# Patient Record
Sex: MALE | Race: ASIAN | NOT HISPANIC OR LATINO | Employment: UNEMPLOYED | URBAN - METROPOLITAN AREA
[De-identification: names, ages, dates, MRNs, and addresses within clinical notes are randomized per-mention and may not be internally consistent; named-entity substitution may affect disease eponyms.]

---

## 2017-03-21 ENCOUNTER — HOSPITAL ENCOUNTER (EMERGENCY)
Facility: HOSPITAL | Age: 47
Discharge: HOME | End: 2017-03-22
Attending: FAMILY MEDICINE

## 2017-03-21 DIAGNOSIS — S99.912A LEFT ANKLE INJURY, INITIAL ENCOUNTER: ICD-10-CM

## 2017-03-21 PROCEDURE — 99283 EMERGENCY DEPT VISIT LOW MDM: CPT | Mod: ,,, | Performed by: EMERGENCY MEDICINE

## 2017-03-21 PROCEDURE — 99283 EMERGENCY DEPT VISIT LOW MDM: CPT

## 2017-03-21 NOTE — ED AVS SNAPSHOT
[facility]  [address]  [facility] [address]               Mic Hunter   3/21/2017 11:26 PM   ED    Description:  Male : 1970   Department:  [facility]           Your Care was Coordinated By:     Provider Role From To    Jayce Smith MD Attending Provider [phone]    Duarte Ogden MD Attending Provider 17 013 --      Reason for Visit     Ankle Pain           Diagnoses this Visit        Comments    Left ankle injury, initial encounter           ED Disposition     ED Disposition Condition Comment    Discharge             To Do List           Follow-up Information     Follow up with [facility] - Orthopedics.    Specialty:  Orthopedics    Contact information:    [address] [address]  [facility] [address] [phone]    Additional information:    [address] - [address]        [facility]    Specialty:  Emergency Medicine    Why:  ASAP for new or worsening symptoms    Contact information:    [address]  [facility] [address] [phone]       These Medications        Disp Refills Start End    ibuprofen (ADVIL,MOTRIN) 600 MG tablet 20 tablet 0 3/22/2017     Take 1 tablet (600 mg total) by mouth every 6 (six) hours as needed for Pain. - Oral    hydrocodone-acetaminophen 5-325mg (NORCO) 5-325 mg per tablet 5 tablet 0 3/22/2017     Take 1 tablet by mouth every 6 (six) hours as needed. - Oral      [facility]     [facility] -  Assistance  Registered nurses in the [facility] provide clinical advisement, health education, appointment booking, and other advisory services.  Call for this free service at [phone].             Medications           Message regarding Medications     Verify the changes and/or additions to your medication regime listed below are the same as discussed with your clinician today.  If any  "of these changes or additions are incorrect, please notify your healthcare provider.        START taking these NEW medications        Refills    ibuprofen (ADVIL,MOTRIN) 600 MG tablet 0    Sig: Take 1 tablet (600 mg total) by mouth every 6 (six) hours as needed for Pain.    Class: Print    Route: Oral    hydrocodone-acetaminophen 5-325mg (NORCO) 5-325 mg per tablet 0    Sig: Take 1 tablet by mouth every 6 (six) hours as needed.    Class: Print    Route: Oral           Verify that the below list of medications is an accurate representation of the medications you are currently taking.  If none reported, the list may be blank. If incorrect, please contact your healthcare provider. Carry this list with you in case of emergency.           Current Medications     hydrocodone-acetaminophen 5-325mg (NORCO) 5-325 mg per tablet Take 1 tablet by mouth every 6 (six) hours as needed.    ibuprofen (ADVIL,MOTRIN) 600 MG tablet Take 1 tablet (600 mg total) by mouth every 6 (six) hours as needed for Pain.           Clinical Reference Information           Your Vitals Were     BP Pulse Temp Resp Height Weight    134/84 64 97.8 °F (36.6 °C) (Oral) 18 5' 2" (1.575 m) 59 kg (130 lb)    SpO2 BMI             94% 23.78 kg/m2         Allergies as of 3/22/2017     No Known Allergies      Immunizations Administered on Date of Encounter - 3/22/2017     None      ED Micro, Lab, POCT     None      ED Imaging Orders     Start Ordered       Status Ordering Provider    03/22/17 0016 03/22/17 0015  X-Ray Ankle Complete Left  1 time imaging      Final result       Discharge References/Attachments     SPRAIN, ANKLE (ADULT) (ENGLISH)      [facility] Sign-Up     Activating your [facility] account is as easy as 1-2-3!     1) Visit [email or web address], select Sign Up Now, enter this activation code and your date of birth, then select Next.  [email or web address]  Expires: 5/6/2017  1:54 AM      2) Create a username and password to use when you visit [facility] in the " future and select a security question in case you lose your password and select Next.    3) Enter your e-mail address and click Sign Up!    Additional Information  If you have questions, please e-mail [email or web address] or call [phone] to talk to our [facility] staff. Remember, [facility] is NOT to be used for urgent needs. For medical emergencies, dial 911.         Smoking Cessation     If you would like to quit smoking:   You may be eligible for free services if you are a [place] resident and started smoking cigarettes before September 1, 1988.  Call the Smoking Cessation [employer] (S[employer]) toll free at (164) 187-9996 or (289) 427-7606.   Call 4-485-QUIT-NOW if you do not meet the above criteria.             [facility] complies with applicable Federal civil rights laws and does not discriminate on the basis of race, color, national origin, age, disability, or sex.        Language Assistance Services     ATTENTION: Language assistance services are available, free of charge. Please call [phone].      ATENCIÓN: Si habla español, tiene a blas disposición servicios gratuitos de asistencia lingüística. Llame al [phone].     CHÚ Ý: N?u b?n nói Ti?ng Vi?t, có các d?ch v? h? tr? ngôn ng? mi?n phí dành cho b?n. G?i s? [phone].

## 2017-03-22 VITALS
DIASTOLIC BLOOD PRESSURE: 84 MMHG | SYSTOLIC BLOOD PRESSURE: 134 MMHG | TEMPERATURE: 98 F | OXYGEN SATURATION: 94 % | RESPIRATION RATE: 18 BRPM | WEIGHT: 130 LBS | HEART RATE: 64 BPM | HEIGHT: 62 IN | BODY MASS INDEX: 23.92 KG/M2

## 2017-03-22 RX ORDER — HYDROCODONE BITARTRATE AND ACETAMINOPHEN 5; 325 MG/1; MG/1
1 TABLET ORAL EVERY 6 HOURS PRN
Qty: 5 TABLET | Refills: 0 | Status: SHIPPED | OUTPATIENT
Start: 2017-03-22

## 2017-03-22 RX ORDER — IBUPROFEN 600 MG/1
600 TABLET ORAL EVERY 6 HOURS PRN
Qty: 20 TABLET | Refills: 0 | Status: SHIPPED | OUTPATIENT
Start: 2017-03-22

## 2017-03-22 NOTE — ED PROVIDER NOTES
Encounter Date: 3/21/2017    SCRIBE #1 NOTE: I, Faizan Bryan, am scribing for, and in the presence of,  Dr. Ogden . I have scribed the entire note.       History     Chief Complaint   Patient presents with    Ankle Pain     Patient reports that he injured left ankle, unknown how injury occured     Review of patient's allergies indicates:  No Known Allergies  HPI Comments: Time seen by provider: 1:50 AM    This is a 47 y.o. male who presents to the ED for evaluation of left ankle pain and swelling secondary to an injury earlier today. The patient denies any numbness or weakness of the extremity or any other sustained injuries.        The history is provided by the patient.     History reviewed. No pertinent past medical history.  No past surgical history on file.  No family history on file.  Social History   Substance Use Topics    Smoking status: None    Smokeless tobacco: None    Alcohol use None     Review of Systems   Constitutional: Negative for fever.   HENT: Negative for sore throat.    Respiratory: Negative for shortness of breath.    Cardiovascular: Negative for chest pain.   Gastrointestinal: Negative for nausea.   Genitourinary: Negative for dysuria.   Musculoskeletal: Negative for back pain.        Left ankle swelling and pain.   Skin: Negative for rash.   Neurological: Negative for weakness.   Hematological: Does not bruise/bleed easily.       Physical Exam   Initial Vitals   BP Pulse Resp Temp SpO2   03/21/17 1910 03/21/17 1910 03/21/17 1910 03/21/17 1910 03/21/17 1910   138/93 77 18 98.2 °F (36.8 °C) 96 %     Physical Exam    Nursing note and vitals reviewed.  Constitutional: He appears well-developed and well-nourished. He is not diaphoretic. No distress.   HENT:   Head: Normocephalic and atraumatic.   Musculoskeletal:   Mild left lateral ankle swelling. No obvious left ankle deformity. DP pulse 2+.   Neurological: He has normal strength. No sensory deficit.   Normal motor and sensory function  distally.    Skin: Skin is warm and dry.         ED Course   Procedures  Labs Reviewed - No data to display       X-Rays:   Independently Interpreted Readings:   Other Readings:  XR Ankle: no acute fracture or dislocation.     Medical Decision Making:   History:   Old Medical Records: I decided to obtain old medical records.  Initial Assessment:   46 yo male with a left ankle injury. He is neurovascularly intact. Will discharge with crutches, walker boot, pain control, and follow-up with orthopaedics.   Independently Interpreted Test(s):   I have ordered and independently interpreted X-rays - see prior notes.  Clinical Tests:   Radiological Study: Ordered and Reviewed            Scribe Attestation:   Scribe #1: I performed the above scribed service and the documentation accurately describes the services I performed. I attest to the accuracy of the note.    Attending Attestation:           Physician Attestation for Scribe:  Physician Attestation Statement for Scribe #1: I, Dr. Ogden , reviewed documentation, as scribed by Faizan Bryan in my presence, and it is both accurate and complete.                 ED Course     Clinical Impression:   The encounter diagnosis was Left ankle injury, initial encounter.    Disposition:   Disposition: Discharged  Condition: Stable       Duarte Ogden MD  04/06/17 0016

## 2017-03-22 NOTE — ED TRIAGE NOTES
47 year old male presents to the ED c/o non traumatic right ankle pain. States that it began hurting earlier today

## 2017-03-22 NOTE — PROVIDER PROGRESS NOTES - EMERGENCY DEPT.
Encounter Date: 3/21/2017    ED Physician Progress Notes        Physician Note:   Patient is a 47-year-old male who sustained an injury to his left ankle earlier today around 2 PM he is now able to bear some weight with the help of his mother's 4 post cane, but he cannot bear weight unassisted.  He has friend previous hand injuries, but denies any injury to this leg or ankle in the past.  He denies any other injury associated with today's trauma  Leg and ankle exam shows tenderness and swelling in the lateral malleolus and below the lateral malleolus.  No gross instability, but pain limits this exam.  No evidence of circulatory or neurologic compromise.    X-rays will be ordered.  These will be turned over to the main ED as intake will be closed before the x-rays are done.